# Patient Record
Sex: MALE | Race: WHITE | NOT HISPANIC OR LATINO | URBAN - METROPOLITAN AREA
[De-identification: names, ages, dates, MRNs, and addresses within clinical notes are randomized per-mention and may not be internally consistent; named-entity substitution may affect disease eponyms.]

---

## 2017-12-13 ENCOUNTER — OUTPATIENT (OUTPATIENT)
Dept: OUTPATIENT SERVICES | Facility: HOSPITAL | Age: 42
LOS: 1 days | End: 2017-12-13
Payer: MEDICAID

## 2017-12-13 VITALS
DIASTOLIC BLOOD PRESSURE: 73 MMHG | TEMPERATURE: 98 F | HEIGHT: 71 IN | WEIGHT: 201.94 LBS | HEART RATE: 68 BPM | RESPIRATION RATE: 14 BRPM | SYSTOLIC BLOOD PRESSURE: 111 MMHG

## 2017-12-13 DIAGNOSIS — K40.30 UNILATERAL INGUINAL HERNIA, WITH OBSTRUCTION, WITHOUT GANGRENE, NOT SPECIFIED AS RECURRENT: ICD-10-CM

## 2017-12-13 DIAGNOSIS — Z41.9 ENCOUNTER FOR PROCEDURE FOR PURPOSES OTHER THAN REMEDYING HEALTH STATE, UNSPECIFIED: Chronic | ICD-10-CM

## 2017-12-13 DIAGNOSIS — Z01.818 ENCOUNTER FOR OTHER PREPROCEDURAL EXAMINATION: ICD-10-CM

## 2017-12-13 LAB
HCT VFR BLD CALC: 43.6 % — SIGNIFICANT CHANGE UP (ref 39–50)
HGB BLD-MCNC: 14.5 G/DL — SIGNIFICANT CHANGE UP (ref 13–17)
MCHC RBC-ENTMCNC: 32.2 PG — SIGNIFICANT CHANGE UP (ref 27–34)
MCHC RBC-ENTMCNC: 33.2 GM/DL — SIGNIFICANT CHANGE UP (ref 32–36)
MCV RBC AUTO: 97 FL — SIGNIFICANT CHANGE UP (ref 80–100)
PLATELET # BLD AUTO: 223 K/UL — SIGNIFICANT CHANGE UP (ref 150–400)
RBC # BLD: 4.49 M/UL — SIGNIFICANT CHANGE UP (ref 4.2–5.8)
RBC # FLD: 11.1 % — SIGNIFICANT CHANGE UP (ref 10.3–14.5)
WBC # BLD: 7.8 K/UL — SIGNIFICANT CHANGE UP (ref 3.8–10.5)
WBC # FLD AUTO: 7.8 K/UL — SIGNIFICANT CHANGE UP (ref 3.8–10.5)

## 2017-12-13 PROCEDURE — G0463: CPT

## 2017-12-13 PROCEDURE — 85027 COMPLETE CBC AUTOMATED: CPT

## 2017-12-13 NOTE — H&P PST ADULT - NSANTHOSAYNRD_GEN_A_CORE
No. JANESSA screening performed.  STOP BANG Legend: 0-2 = LOW Risk; 3-4 = INTERMEDIATE Risk; 5-8 = HIGH Risk

## 2017-12-13 NOTE — H&P PST ADULT - RS GEN PE MLT RESP DETAILS PC
respirations non-labored/breath sounds equal/good air movement/clear to auscultation bilaterally/airway patent

## 2017-12-13 NOTE — H&P PST ADULT - HISTORY OF PRESENT ILLNESS
42 year old male presents for PST prior to Repair LEFT inguinal Hernia with MESH with Dr Shiv Ovalles on 12/22/17 - pt notes  he initially noted "sharp pain and noted swelling to left groin area."  Pt  also notes "hernia is reducible ("I can push it back in") Pt was seen by PCP Dr Hays at that time - hernia diagnosed - pt was sent for eval with Dr Ovalles and following eval pt is electing for scheduled procedure.

## 2017-12-13 NOTE — H&P PST ADULT - MUSCULOSKELETAL COMMENTS
notes low back pain some RIGHT leg numbness which is normal for him secondary to herniated Lumbar discs

## 2017-12-13 NOTE — H&P PST ADULT - ASSESSMENT
42 year old male with Unilateral Inguinal Hernia with obstruction, without gangrene, not specified as recurrent - scheduled for Repair LEFT Inguinal Hernia with MESH with Dr Shiv Ovalles on 12/22/17 -

## 2017-12-13 NOTE — H&P PST ADULT - PMH
Herniated cervical disc  C4-C5  Herniated lumbar intervertebral disc  L4-L5, S1  Numbness  Numbness to RIGHT Leg - sceondary to Herniated Disc L4-L5  Sciatica  Was  LEFT Side for years now RIGHT Side  Unilateral inguinal hernia with obstruction and without gangrene, recurrence not specified
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
## 2017-12-13 NOTE — H&P PST ADULT - PROBLEM SELECTOR PLAN 1
PST labs; CBC -No Medical Clearance Needed - Pt instructed to stop Advil - pre-op instructions as well as pre-op wash instructions given to pt with understanding verbalized

## 2017-12-21 ENCOUNTER — TRANSCRIPTION ENCOUNTER (OUTPATIENT)
Age: 42
End: 2017-12-21

## 2017-12-21 RX ORDER — SODIUM CHLORIDE 9 MG/ML
1000 INJECTION, SOLUTION INTRAVENOUS
Qty: 0 | Refills: 0 | Status: DISCONTINUED | OUTPATIENT
Start: 2017-12-22 | End: 2017-12-22

## 2017-12-22 ENCOUNTER — OUTPATIENT (OUTPATIENT)
Dept: OUTPATIENT SERVICES | Facility: HOSPITAL | Age: 42
LOS: 1 days | End: 2017-12-22
Payer: MEDICAID

## 2017-12-22 ENCOUNTER — RESULT REVIEW (OUTPATIENT)
Age: 42
End: 2017-12-22

## 2017-12-22 VITALS
SYSTOLIC BLOOD PRESSURE: 128 MMHG | HEIGHT: 71 IN | RESPIRATION RATE: 12 BRPM | WEIGHT: 201.94 LBS | DIASTOLIC BLOOD PRESSURE: 75 MMHG | TEMPERATURE: 98 F | OXYGEN SATURATION: 95 % | HEART RATE: 72 BPM

## 2017-12-22 VITALS
RESPIRATION RATE: 14 BRPM | TEMPERATURE: 98 F | SYSTOLIC BLOOD PRESSURE: 133 MMHG | DIASTOLIC BLOOD PRESSURE: 72 MMHG | HEART RATE: 60 BPM | OXYGEN SATURATION: 100 %

## 2017-12-22 DIAGNOSIS — K40.30 UNILATERAL INGUINAL HERNIA, WITH OBSTRUCTION, WITHOUT GANGRENE, NOT SPECIFIED AS RECURRENT: ICD-10-CM

## 2017-12-22 DIAGNOSIS — Z41.9 ENCOUNTER FOR PROCEDURE FOR PURPOSES OTHER THAN REMEDYING HEALTH STATE, UNSPECIFIED: Chronic | ICD-10-CM

## 2017-12-22 PROCEDURE — 88302 TISSUE EXAM BY PATHOLOGIST: CPT

## 2017-12-22 PROCEDURE — C1781: CPT

## 2017-12-22 PROCEDURE — 49505 PRP I/HERN INIT REDUC >5 YR: CPT | Mod: LT

## 2017-12-22 PROCEDURE — 88302 TISSUE EXAM BY PATHOLOGIST: CPT | Mod: 26

## 2017-12-22 RX ORDER — IBUPROFEN 200 MG
2 TABLET ORAL
Qty: 0 | Refills: 0 | COMMUNITY

## 2017-12-22 RX ORDER — HYDROMORPHONE HYDROCHLORIDE 2 MG/ML
0.5 INJECTION INTRAMUSCULAR; INTRAVENOUS; SUBCUTANEOUS
Qty: 0 | Refills: 0 | Status: DISCONTINUED | OUTPATIENT
Start: 2017-12-22 | End: 2017-12-26

## 2017-12-22 RX ORDER — SODIUM CHLORIDE 9 MG/ML
1000 INJECTION, SOLUTION INTRAVENOUS
Qty: 0 | Refills: 0 | Status: DISCONTINUED | OUTPATIENT
Start: 2017-12-22 | End: 2017-12-26

## 2017-12-22 RX ORDER — CEFAZOLIN SODIUM 1 G
1000 VIAL (EA) INJECTION ONCE
Qty: 0 | Refills: 0 | Status: COMPLETED | OUTPATIENT
Start: 2017-12-22 | End: 2017-12-22

## 2017-12-22 RX ORDER — MAGNESIUM HYDROXIDE 400 MG/1
0 TABLET, CHEWABLE ORAL
Qty: 0 | Refills: 0 | COMMUNITY

## 2017-12-22 RX ORDER — ACETAMINOPHEN 500 MG
2 TABLET ORAL
Qty: 0 | Refills: 0 | COMMUNITY

## 2017-12-22 RX ADMIN — SODIUM CHLORIDE 60 MILLILITER(S): 9 INJECTION, SOLUTION INTRAVENOUS at 13:23

## 2017-12-22 NOTE — ASU DISCHARGE PLAN (ADULT/PEDIATRIC). - MEDICATION SUMMARY - MEDICATIONS TO TAKE
I will START or STAY ON the medications listed below when I get home from the hospital:    Tylenol 325 mg oral tablet  -- 2 tab(s) by mouth every 4 hours  -- Indication: For PAIN    Norco 5 mg-325 mg oral tablet  -- 2 tab(s) by mouth every 6 hours MDD:8  -- Caution federal law prohibits the transfer of this drug to any person other  than the person for whom it was prescribed.  May cause drowsiness.  Alcohol may intensify this effect.  Use care when operating dangerous machinery.  This product contains acetaminophen.  Do not use  with any other product containing acetaminophen to prevent possible liver damage.  Using more of this medication than prescribed may cause serious breathing problems.    -- Indication: For PAIN    Milk of Magnesia  -- 30 CC BY MOUTH  FOR CONSTIPATION    -- Indication: For CONSTIPATION

## 2017-12-22 NOTE — ASU DISCHARGE PLAN (ADULT/PEDIATRIC). - MEDICATION SUMMARY - MEDICATIONS TO STOP TAKING
I will STOP taking the medications listed below when I get home from the hospital:    Advil 200 mg oral tablet  -- 2 tab(s) by mouth 3 times a week, As Needed - for mild pain (BACK PAIN)

## 2017-12-27 LAB — SURGICAL PATHOLOGY FINAL REPORT - CH: SIGNIFICANT CHANGE UP

## 2018-07-25 PROBLEM — M54.30 SCIATICA, UNSPECIFIED SIDE: Chronic | Status: ACTIVE | Noted: 2017-12-13

## 2018-07-25 PROBLEM — R20.0 ANESTHESIA OF SKIN: Chronic | Status: ACTIVE | Noted: 2017-12-13

## 2018-07-25 PROBLEM — Z00.00 ENCOUNTER FOR PREVENTIVE HEALTH EXAMINATION: Status: ACTIVE | Noted: 2018-07-25

## 2018-07-25 PROBLEM — M50.20 OTHER CERVICAL DISC DISPLACEMENT, UNSPECIFIED CERVICAL REGION: Chronic | Status: ACTIVE | Noted: 2017-12-13

## 2018-07-25 PROBLEM — M51.26 OTHER INTERVERTEBRAL DISC DISPLACEMENT, LUMBAR REGION: Chronic | Status: ACTIVE | Noted: 2017-12-13

## 2018-07-25 PROBLEM — K40.30 UNILATERAL INGUINAL HERNIA, WITH OBSTRUCTION, WITHOUT GANGRENE, NOT SPECIFIED AS RECURRENT: Chronic | Status: ACTIVE | Noted: 2017-12-13

## 2018-08-03 ENCOUNTER — APPOINTMENT (OUTPATIENT)
Dept: ORTHOPEDIC SURGERY | Facility: CLINIC | Age: 43
End: 2018-08-03
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
## 2022-06-14 NOTE — ASU DISCHARGE PLAN (ADULT/PEDIATRIC). - RETURN TO WORK
called [facility] , Nestor Julian, regarding the status of group home placement and was unable to leave voicemail because voicemail was full.        EARNEST Farr No

## 2022-09-28 ENCOUNTER — NON-APPOINTMENT (OUTPATIENT)
Age: 47
End: 2022-09-28

## 2024-01-17 ENCOUNTER — NON-APPOINTMENT (OUTPATIENT)
Age: 49
End: 2024-01-17

## 2024-01-17 ENCOUNTER — APPOINTMENT (OUTPATIENT)
Dept: ORTHOPEDIC SURGERY | Facility: CLINIC | Age: 49
End: 2024-01-17
Payer: MEDICAID

## 2024-01-17 VITALS
WEIGHT: 190 LBS | SYSTOLIC BLOOD PRESSURE: 105 MMHG | BODY MASS INDEX: 27.2 KG/M2 | HEART RATE: 92 BPM | HEIGHT: 70 IN | DIASTOLIC BLOOD PRESSURE: 67 MMHG

## 2024-01-17 DIAGNOSIS — M54.12 RADICULOPATHY, CERVICAL REGION: ICD-10-CM

## 2024-01-17 DIAGNOSIS — M54.2 CERVICALGIA: ICD-10-CM

## 2024-01-17 DIAGNOSIS — M50.30 OTHER CERVICAL DISC DEGENERATION, UNSPECIFIED CERVICAL REGION: ICD-10-CM

## 2024-01-17 DIAGNOSIS — M54.16 RADICULOPATHY, LUMBAR REGION: ICD-10-CM

## 2024-01-17 DIAGNOSIS — G89.29 CERVICALGIA: ICD-10-CM

## 2024-01-17 DIAGNOSIS — M43.10 SPONDYLOLISTHESIS, SITE UNSPECIFIED: ICD-10-CM

## 2024-01-17 DIAGNOSIS — M47.816 SPONDYLOSIS W/OUT MYELOPATHY OR RADICULOPATHY, LUMBAR REGION: ICD-10-CM

## 2024-01-17 PROCEDURE — 99204 OFFICE O/P NEW MOD 45 MIN: CPT

## 2024-01-17 PROCEDURE — 72170 X-RAY EXAM OF PELVIS: CPT

## 2024-01-17 PROCEDURE — 72050 X-RAY EXAM NECK SPINE 4/5VWS: CPT

## 2024-01-17 PROCEDURE — 72110 X-RAY EXAM L-2 SPINE 4/>VWS: CPT
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
## 2024-01-18 NOTE — DISCUSSION/SUMMARY
[Medication Risks Reviewed] : Medication risks reviewed [de-identified] : Patient today presents for evaluation of low back pain as well as radiating leg pain and neck pain radiating down into the left arm.  He has suggestion of some shoulder pathology with possible rotator cuff tendinitis versus SLAP tear suspected.  He can follow-up with the shoulder surgeon for this condition.  With regard to lumbar spine he has significant disc degeneration at L4-5 as well as L5-S1 with spondylolisthesis and pars defect at L4-5.  Based on his clinical presentation surgical intervention would be indicated with laminectomy and fusion from L4-S1 with intervertebral cages.  With regard to the cervical spine he has pathology probably the C6-7 level with loss of disc height and endplate sclerosis.  Recommend MRI cervical spine to better evaluate that condition.  I will see him back after the MRI to discuss further treatment options.  For now the patient understands that surgery is an option for him in both the cervical spine and lumbar spine if he chooses to pursue it.  Otherwise he can continue follow-up with the pain management service  The patient was educated regarding their condition, treatment options as well as prescribed course of treatment.  Risks and benefits as well as alternatives to the proposed treatment were also provided to the patient  They were given the opportunity to have all their questions answered to their satisfaction.  Vital signs were reviewed with the patient and the patient was instructed to followup with their primary care provider for further management. There were no PAs or scribes used in the evaluation, exam or treatment plan discussion. The surgeon was the primary evaluating or treating physician as noted above.

## 2024-01-18 NOTE — PHYSICAL EXAM
[LE] : Sensory: Intact in bilateral lower extremities [2+] : right patella 2+ [1+] : left ankle jerk 1+ [DP] : dorsalis pedis 2+ and symmetric bilaterally [SLR] : negative straight leg raise [Plantar Reflex Right Only] : absent on the right [Plantar Reflex Left Only] : absent on the left [DTR Reflexes Clonus Of Right Ankle (___ Beats)] : absent on the right [DTR Reflexes Clonus Of Left Ankle (___ Beats)] : absent on the left [de-identified] : The pt is awake, alert and oriented to self, place and time, is comfortable and in no acute distress. Gait examination reveals a narrow based, non-ataxic, non-antalgic gait. The pt can heel and toe walk without difficulty. No rashes or ecchymotic lesions noted over the neck, back and lower extremities bilaterally. No obvious abnormal spinal curvature in the sagittal and coronal planes. No tenderness over the cervical, thoracic or lumbar spine,.  Paraspinal cervical tenderness noted in the right lumbar more than left with right gluteal more than left gluteal tenderness.  There is no sacroiliac tenderness bilaterally. No tenderness over the greater trochanter bilaterally. No atrophy or abnormal movements noted in the upper or lower extremities bilaterally. No swelling seen in the upper or lower extremities bilaterally. No joint laxity noted in the upper and lower extremity joints bilaterally. No cervical lymphadenopathy noted anteriorly.   Negative straight leg raise to 60 in the supine position. No groin pain with hip internal rotation, negative SARA test bilaterally. There are 2+ DP pulses bilaterally. There is a negative Babinski sign and no clonus bilaterally in the upper or lower extremities. [de-identified] : cspine flex chin to chest., ext 30 degrees, l eft lat rot 45 degrees, right lat rot 30 degrees with stiffness left paraspinal cervical tenderness flex to ankle, ext 30 degrees with more pain + Mild Neer/Hawkin Left supraspinatus and paraspinal cervical tenderness. [de-identified] : 4 views cervical spine obtained today demonstrate no significant scoliosis.  Straightening of cervical lordosis in the mid section from C4-C7 with significant disc degeneration primarily at C6-7.  Loss of disc height endplate sclerosis anterior osteophytes noted at C6-7.  Some loss of disc at also noted at C5-6 without osteophyte anteriorly.  No dynamic instability between flexion-extension.  There is improvement of cervical lordosis in extension.  4 views lumbar spine demonstrate right-sided lumbar curve from L1-L5.  On the lateral projection straightening of lumbar lordosis.  Significant disc degeneration at L4-5 and L5-S1 noted with vacuum disc phenomenon primarily at L4-5.  Grade 1-2 spondylolisthesis at L4 with suggestion of pars defect at L4.  No obvious dynamic instability appreciated at the L4-5 level.  AP pelvis demonstrates normal appearance hips bilaterally.  No acute fractures.  No significant hip degeneration.   Office Location: [address] [place] [address] Office Phone: [phone] Office Fax: [phone] PATIENT NAME: Krishna De Leon PATIENT PHONE NUMBER: [phone] PATIENT ID: [id number] : 1975 DATE OF EXAM: 07/10/2023 R. Phys. Name: Vazquez Arie RZaki Carbajal Address: [address] [place] [address] [place], [address] R. Phys. Phone: [phone] MRI-LUMBAR SPINE NON CONTRAST  HISTORY: M54.41 Lower back pain with right sciatica M62.830 Spasm of back muscles R26.2 Difficulty Walking R20.0 Numbness Lower/Upper Extremity  Technique: Multiplanar, multisequence MRI of the lumbar spine was performed without the administration of intravenous contrast .  Prior: 2017.  Findings:  : Alignment: There is mild dextroscoliosis. Grade 1 anterolisthesis L4-L5. There is bilateral L4 spondylolysis.  Conus: The conus is normal in size and signal.  Bone marrow: No evidence of metastatic disease or acute vertebral body compression fracture.  Other findings: None.  Discs:Multilevel disc desiccation and disc space narrowing.  At L5-S1: There is asymmetric disc bulge with right greater than left neuroforaminal stenosis. Compression of exiting nerve root on the right. There is no significant change from previous examination.  At L4-5: There is facet arthropathy and grade 1 anterolisthesis. There is mild spinal canal stenosis. There is severe left and mild to moderate right neuroforaminal stenosis with compression of exiting nerve root on the left. There is no significant change from previous study.  At L3-4: There is disc bulge and facet arthropathy with mild bilateral neural foraminal stenosis.  At L2-3: No significant spinal canal or neural foraminal stenosis.  At L1-2: There is mild disc bulge increased from previous examination. There is mild spinal canal stenosis.  There are degenerative disc changes at T11/T12 and T12/L1 without significant compression of the spinal cord.  IMPRESSION:  1. Moderate multilevel degenerative change without high-grade spinal canal stenosis. 2. Multilevel neural foraminal stenosis. There is compression of exiting right L5 and left L4 nerve roots. 3. Grade 1 anterolisthesis L4-L5.  Signed by: Lupe Reeves MD Signed Date: 2023 8:58 AM EDT    SIGNED BY: Lupe Reeves M.D., Ext. [phone] 08:58 AM

## 2024-02-07 NOTE — ASU DISCHARGE PLAN (ADULT/PEDIATRIC). - DO NOT DRIVE IF TAKING PAIN MEDICATION
Statement Selected
Apixaban/Eliquis increases your risk for bleeding. Notify your doctor if you experience any of the following side effects: bleeding, coughing or vomiting blood, red or black stool, unexpected pain or swelling, itching or hives, chest pain, chest tightness, trouble breathing, changes in how much or how often you urinate, red or pink urine, numbness or tingling in your feet, or unusual muscle weakness. When Apixaban/Eliquis is taken with other medicines, they can affect how it works. Taking other medications such as aspirin, blood thinners, nonsteroidal anti-inflammatories, and medications that treat depression can increase your risk of bleeding. It is very important to tell your health care provider about all of the other medicines, including over-the-counter medications, herbs, and vitamins you are taking. DO NOT start, stop, or change the dosage of any medicine, including over-the-counter medicines, vitamins, and herbal products without your doctor’s approval. Any products containing aspirin or are nonsteroidal anti-inflammatories lessen the blood’s ability to form clots and add to the effect of Apixaban/Eliquis. Never take aspirin or medicines that contain aspirin without speaking to your doctor.

## 2024-11-04 NOTE — H&P PST ADULT - PROBLEM SELECTOR PROBLEM 1
HCC coding opportunities       Chart reviewed, no opportunity found: CHART REVIEWED, NO OPPORTUNITY FOUND        Patients Insurance        Commercial Insurance: [employer]        Unilateral inguinal hernia with obstruction and without gangrene, recurrence not specified